# Patient Record
Sex: MALE | Race: WHITE | HISPANIC OR LATINO | Employment: PART TIME | URBAN - METROPOLITAN AREA
[De-identification: names, ages, dates, MRNs, and addresses within clinical notes are randomized per-mention and may not be internally consistent; named-entity substitution may affect disease eponyms.]

---

## 2019-06-17 ENCOUNTER — HOSPITAL ENCOUNTER (EMERGENCY)
Facility: MEDICAL CENTER | Age: 24
End: 2019-06-17
Attending: EMERGENCY MEDICINE
Payer: COMMERCIAL

## 2019-06-17 VITALS
WEIGHT: 177.69 LBS | BODY MASS INDEX: 24.88 KG/M2 | DIASTOLIC BLOOD PRESSURE: 80 MMHG | SYSTOLIC BLOOD PRESSURE: 124 MMHG | OXYGEN SATURATION: 99 % | HEIGHT: 71 IN | RESPIRATION RATE: 16 BRPM | TEMPERATURE: 98.7 F | HEART RATE: 92 BPM

## 2019-06-17 DIAGNOSIS — S09.90XA CLOSED HEAD INJURY, INITIAL ENCOUNTER: ICD-10-CM

## 2019-06-17 DIAGNOSIS — S16.1XXA STRAIN OF NECK MUSCLE, INITIAL ENCOUNTER: ICD-10-CM

## 2019-06-17 PROCEDURE — 99283 EMERGENCY DEPT VISIT LOW MDM: CPT

## 2019-06-17 NOTE — LETTER
"  FORM C-4:  EMPLOYEE’S CLAIM FOR COMPENSATION/ REPORT OF INITIAL TREATMENT  EMPLOYEE’S CLAIM - PROVIDE ALL INFORMATION REQUESTED   First Name  Eris Last Name  Vernell Birthdate             Age  1995 24 y.o. Sex  male Claim Number   Home Employee Address  [id number]  [place]                                     Zip  [address] Height  1.803 m (5' 11\") Weight  80.6 kg (177 lb 11.1 oz) N  [id number]   Mailing Employee Address                           [id number]   [place]               Zip  [address] Telephone  [phone] (home)  Primary Language Spoken  ENGLISH   Insurer  *** Third Party   MISC WORKERS COMP Employee's Occupation (Job Title) When Injury or Occupational Disease Occurred     Employer's Name   Telephone  [phone]    Employer Address  [address]  [facility] [address]] Zip  [address]   Date of Injury  6/17/2019       Hour of Injury  2:30 PM Date Employer Notified  6/17/2019 Last Day of Work after Injury or Occupational Disease  6/17/2019 Supervisor to Whom Injury Reported  [facility]   Address or Location of Accident (if applicable)  [[address]   What were you doing at the time of accident? (if applicable)  Pulled tire from top rack & placed on floor to check to see if right one & had a diff. tire fall on me    How did this injury or occupational disease occur? Be specific and answer in detail. Use additional sheet if necessary)  Pulled tire from top rack & placed on floor to check to see if right one & had a diff. tire fall on me   If you believe that you have an occupational disease, when did you first have knowledge of the disability and it relationship to your employment?  N/A Witnesses to the Accident  N/A     Nature of Injury or Occupational Disease  Workers' Compensation  Part(s) of Body Injured or Affected  Brain, Soft Tissue - Neck, Upper Back Area (Thoracic Area)    I certify that the " above is true and correct to the best of my knowledge and that I have provided this information in order to obtain the benefits of [place]  I hereby authorize any physician, chiropractor, surgeon, practitioner, or other person, any hospital, including [facility], any medical service organization, any insurance company, or other institution or organization to release to each other, any medical or other information, including benefits paid or payable, pertinent to this injury or disease, except information relative to diagnosis, treatment and/or counseling for AIDS, psychological conditions, alcohol or controlled substances, for which I must give specific authorization.  A Photostat of this authorization shall be as valid as the original.   Date Place   Employee’s Signature   THIS REPORT MUST BE COMPLETED AND MAILED WITHIN 3 WORKING DAYS OF TREATMENT   Place  [facility], EMERGENCY DEPT  Name of Facility   [facility]   Date  6/17/2019 Diagnosis  (S09.90XA) Closed head injury, initial encounter  (S16.1XXA) Strain of neck muscle, initial encounter Is there evidence the injured employee was under the influence of alcohol and/or another controlled substance at the time of accident?   Hour  8:43 PM Description of Injury or Disease  Closed head injury, initial encounter  Strain of neck muscle, initial encounter No   Treatment  Supportive care  Have you advised the patient to remain off work five days or more?         No   X-Ray Findings      If Yes   From Date    To Date      From information given by the employee, together with medical evidence, can you directly connect this injury or occupational disease as job incurred?  Yes If No, is the employee capable of: Full Duty  No Modified Duty  No   Is additional medical care by a physician  "indicated?  Yes If Modified Duty, Specify any Limitations / Restrictions  No work till wednesday     Do you know of any previous injury or disease contributing to this condition or occupational disease?  No   Date  6/17/2019 Print Doctor’s Name  Pilar Suleman MCELROY KRISTA certify the employer’s copy of this form was mailed on:   Address  [address] [address] [phone] Insurer’s Use Only   [facility]  [address]    Provider’s Tax ID Number  [id number] Telephone  Dept: [phone]    Doctor’s Signature    Degree       Original - TREATING PHYSICIAN OR CHIROPRACTOR   Pg 2-Insurer/TPA   Pg 3-Employer   Pg 4-Employee                                                                                                  Form C-4 (rev01/03)     BRIEF DESCRIPTION OF RIGHTS AND BENEFITS  (Pursuant to [place]RS 616C.050)    Notice of Injury or Occupational Disease (Incident Report Form C-1): If an injury or occupational disease (OD) arises out of and in the course of employment, you must provide written notice to your employer as soon as practicable, but no later than 7 days after the accident or OD. Your employer shall maintain a sufficient supply of the required forms.    Claim for Compensation (Form C-4): If medical treatment is sought, the form C-4 is available at the place of initial treatment. A completed \"Claim for Compensation\" (Form C-4) must be filed within 90 days after an accident or OD. The treating physician or chiropractor must, within 3 working days after treatment, complete and mail to the employer, the employer's insurer and third-party , the Claim for Compensation.    Medical Treatment: If you require medical treatment for your on-the-job injury or OD, you may be required to select a physician or chiropractor from a list provided by your workers’ compensation insurer, if it has contracted with an Organization for Managed Care (MCO) or Preferred Provider Organization (PPO) or " providers of health care. If your employer has not entered into a contract with an MCO or PPO, you may select a physician or chiropractor from the Panel of Physicians and Chiropractors. Any medical costs related to your industrial injury or OD will be paid by your insurer.    Temporary Total Disability (TTD): If your doctor has certified that you are unable to work for a period of at least 5 consecutive days, or 5 cumulative days in a 20-day period, or places restrictions on you that your employer does not accommodate, you may be entitled to TTD compensation.    Temporary Partial Disability (TPD): If the wage you receive upon reemployment is less than the compensation for TTD to which you are entitled, the insurer may be required to pay you TPD compensation to make up the difference. TPD can only be paid for a maximum of 24 months.    Permanent Partial Disability (PPD): When your medical condition is stable and there is an indication of a PPD as a result of your injury or OD, within 30 days, your insurer must arrange for an evaluation by a rating physician or chiropractor to determine the degree of your PPD. The amount of your PPD award depends on the date of injury, the results of the PPD evaluation and your age and wage.    Permanent Total Disability (PTD): If you are medically certified by a treating physician or chiropractor as permanently and totally disabled and have been granted a PTD status by your insurer, you are entitled to receive monthly benefits not to exceed 66 2/3% of your average monthly wage. The amount of your PTD payments is subject to reduction if you previously received a PPD award.    Vocational Rehabilitation Services: You may be eligible for vocational rehabilitation services if you are unable to return to the job due to a permanent physical impairment or permanent restrictions as a result of your injury or occupational disease.    Transportation and Per Laurie Reimbursement: You may be  eligible for travel expenses and per katlyn associated with medical treatment.  Reopening: You may be able to reopen your claim if your condition worsens after claim closure.    Appeal Process: If you disagree with a written determination issued by the insurer or the insurer does not respond to your request, you may appeal to the Department of Administration, , by following the instructions contained in your determination letter. You must appeal the determination within 70 days from the date of the determination letter at [address], [address], [place] [address], or [address], [place], [place] [address]. If you disagree with the  decision, you may appeal to the Department of Administration, . You must file your appeal within 30 days from the date of the  decision letter at [address], [address], [place], [place] [address], or [address], [place], [place] [address]. If you disagree with a decision of an , you may file a petition for judicial review with the District Court. You must do so within 30 days of the Appeal Officer’s decision. You may be represented by an  at your own expense or you may contact the [facility] for possible representation.    [place]  for Injured Workers (NAIW): If you disagree with a  decision, you may request that NAIW represent you without charge at an  Hearing. For information regarding denial of benefits, you may contact the [facility] at: [address], [address], [place] [address], [phone], or [address], [address], [place] [address], [phone]    To File a Complaint with the Division: If you wish to file a complaint with the  of the Division of Industrial Relations (DIR), please contact the Workers’ Compensation Section, [address], [address], [place],  [place] [address], telephone [phone], or [address], [address], [place] [address], telephone [phone].    For assistance with Workers’ Compensation Issues: you may contact the Office of the Governor Consumer Health Assistance, [address] [place] [address], [address], [place], [place] [address], Toll Free 1-536.567.1288, Web site: [email or web address].[email or web address]., E-mail [email or web address].                                                                                                                                                                               __________________________________________________________________                                    _________________            Employee Name / Signature                                                                                                                            Date                                       D-2 (rev. 10/07)

## 2019-06-17 NOTE — LETTER
"  FORM C-4:  EMPLOYEE’S CLAIM FOR COMPENSATION/ REPORT OF INITIAL TREATMENT  EMPLOYEE’S CLAIM - PROVIDE ALL INFORMATION REQUESTED   First Name  Eris Last Name  Vernell Birthdate             Age  1995 24 y.o. Sex  male Claim Number   Home Employee Address  [address]  [place]                                     Zip  [address] Height  1.803 m (5' 11\") Weight  80.6 kg (177 lb 11.1 oz) N  [id number]   Mailing Employee Address                           [id number]   [place]               Zip  [address] Telephone  [phone] (home)  Primary Language Spoken  ENGLISH   Insurer  *** Third Party   MISC WORKERS COMP Employee's Occupation (Job Title) When Injury or Occupational Disease Occurred     Employer's Name   Telephone  [phone]    Employer Address  [address]  [facility] [address]] Zip  [address]   Date of Injury  6/17/2019       Hour of Injury  2:30 PM Date Employer Notified  6/17/2019 Last Day of Work after Injury or Occupational Disease  6/17/2019 Supervisor to Whom Injury Reported  [facility]   Address or Location of Accident (if applicable)  [[address]   What were you doing at the time of accident? (if applicable)  Pulled tire from top rack & placed on floor to check to see if right one & had a diff. tire fall on me    How did this injury or occupational disease occur? Be specific and answer in detail. Use additional sheet if necessary)  Pulled tire from top rack & placed on floor to check to see if right one & had a diff. tire fall on me   If you believe that you have an occupational disease, when did you first have knowledge of the disability and it relationship to your employment?  N/A Witnesses to the Accident  N/A     Nature of Injury or Occupational Disease  Workers' Compensation  Part(s) of Body Injured or Affected  Brain, Soft Tissue - Neck, Upper Back Area (Thoracic Area)    I certify that the " above is true and correct to the best of my knowledge and that I have provided this information in order to obtain the benefits of [place] ([place] 616A [place], inclusive or Chapter 617 of [place]RS).  I hereby authorize any physician, chiropractor, surgeon, practitioner, or other person, any hospital, including [facility], any medical service organization, any insurance company, or other institution or organization to release to each other, any medical or other information, including benefits paid or payable, pertinent to this injury or disease, except information relative to diagnosis, treatment and/or counseling for AIDS, psychological conditions, alcohol or controlled substances, for which I must give specific authorization.  A Photostat of this authorization shall be as valid as the original.   Date Place   Employee’s Signature   THIS REPORT MUST BE COMPLETED AND MAILED WITHIN 3 WORKING DAYS OF TREATMENT   Place  [facility], EMERGENCY DEPT  Name of Facility   [facility]   Date  6/17/2019 Diagnosis  (S09.90XA) Closed head injury, initial encounter  (S16.1XXA) Strain of neck muscle, initial encounter Is there evidence the injured employee was under the influence of alcohol and/or another controlled substance at the time of accident?   Hour  8:47 PM Description of Injury or Disease  Closed head injury, initial encounter  Strain of neck muscle, initial encounter No   Treatment  Supportive care  Have you advised the patient to remain off work five days or more?         No   X-Ray Findings      If Yes   From Date    To Date      From information given by the employee, together with medical evidence, can you directly connect this injury or occupational disease as job incurred?  Yes If No, is the employee capable of: Full Duty  No Modified Duty  No   Is additional medical care by a physician  "indicated?  Yes If Modified Duty, Specify any Limitations / Restrictions  No work till wednesday     Do you know of any previous injury or disease contributing to this condition or occupational disease?  No   Date  6/17/2019 Print Doctor’s Name  Pilar Suleman MCELROY KRISTA certify the employer’s copy of this form was mailed on:   Address  [address] [address] [phone] Insurer’s Use Only   [facility]  [address]    Provider’s Tax ID Number  [id number] Telephone  Dept: [phone]    Doctor’s Signature    Degree       Original - TREATING PHYSICIAN OR CHIROPRACTOR   Pg 2-Insurer/TPA   Pg 3-Employer   Pg 4-Employee                                                                                                  Form C-4 (rev01/03)     BRIEF DESCRIPTION OF RIGHTS AND BENEFITS  ([place]ursuant to [place] 616C.050)    Notice of Injury or Occupational Disease (Incident Report Form C-1): If an injury or occupational disease (OD) arises out of and in the course of employment, you must provide written notice to your employer as soon as practicable, but no later than 7 days after the accident or OD. Your employer shall maintain a sufficient supply of the required forms.    Claim for Compensation (Form C-4): If medical treatment is sought, the form C-4 is available at the place of initial treatment. A completed \"Claim for Compensation\" (Form C-4) must be filed within 90 days after an accident or OD. The treating physician or chiropractor must, within 3 working days after treatment, complete and mail to the employer, the employer's insurer and third-party , the Claim for Compensation.    Medical Treatment: If you require medical treatment for your on-the-job injury or OD, you may be required to select a physician or chiropractor from a list provided by your workers’ compensation insurer, if it has contracted with an Organization for Managed Care (MCO) or Preferred Provider Organization (PPO) or " providers of health care. If your employer has not entered into a contract with an MCO or PPO, you may select a physician or chiropractor from the Panel of Physicians and Chiropractors. Any medical costs related to your industrial injury or OD will be paid by your insurer.    Temporary Total Disability (TTD): If your doctor has certified that you are unable to work for a period of at least 5 consecutive days, or 5 cumulative days in a 20-day period, or places restrictions on you that your employer does not accommodate, you may be entitled to TTD compensation.    Temporary Partial Disability (TPD): If the wage you receive upon reemployment is less than the compensation for TTD to which you are entitled, the insurer may be required to pay you TPD compensation to make up the difference. TPD can only be paid for a maximum of 24 months.    Permanent Partial Disability (PPD): When your medical condition is stable and there is an indication of a PPD as a result of your injury or OD, within 30 days, your insurer must arrange for an evaluation by a rating physician or chiropractor to determine the degree of your PPD. The amount of your PPD award depends on the date of injury, the results of the PPD evaluation and your age and wage.    Permanent Total Disability (PTD): If you are medically certified by a treating physician or chiropractor as permanently and totally disabled and have been granted a PTD status by your insurer, you are entitled to receive monthly benefits not to exceed 66 2/3% of your average monthly wage. The amount of your PTD payments is subject to reduction if you previously received a PPD award.    Vocational Rehabilitation Services: You may be eligible for vocational rehabilitation services if you are unable to return to the job due to a permanent physical impairment or permanent restrictions as a result of your injury or occupational disease.    Transportation and Per Laurie Reimbursement: You may be  eligible for travel expenses and per katlyn associated with medical treatment.  Reopening: You may be able to reopen your claim if your condition worsens after claim closure.    Appeal Process: If you disagree with a written determination issued by the insurer or the insurer does not respond to your request, you may appeal to the Department of Administration, , by following the instructions contained in your determination letter. You must appeal the determination within 70 days from the date of the determination letter at [address], [address], [place] [address], or [address], [place], [place] [address]. If you disagree with the  decision, you may appeal to the Department of Administration, . You must file your appeal within 30 days from the date of the  decision letter at [address], [address], [place], [place] [address], or [address] [facility] [address], [place], [place] [address]. If you disagree with a decision of an , you may file a petition for judicial review with the District Court. You must do so within 30 days of the Appeal Officer’s decision. You may be represented by an  at your own expense or you may contact the [facility] for possible representation.    [place]  for Injured Workers (NAIW): If you disagree with a  decision, you may request that NAIW represent you without charge at an  Hearing. For information regarding denial of benefits, you may contact the [facility] at: [address], [address], [place] [address], [phone], or [address], [place] [address], [phone]    To File a Complaint with the Division: If you wish to file a complaint with the  of the Division of Industrial Relations (DIR), please contact the Workers’ Compensation Section, [address], [facility] [address], [place],  [place] [address], telephone [phone], or [address], [address], [place] [address], telephone [phone].    For assistance with Workers’ Compensation Issues: you may contact the Office of the Governor Consumer Health Assistance, [address] [place] [address], [address], [place], [place] [address], Toll Free 1-377.230.2090, Web site: [email or web address]://[email or web address]., E-mail [email or web address].                                                                                                                                                                               __________________________________________________________________                                    _________________            Employee Name / Signature                                                                                                                            Date                                       D-2 (rev. 10/07)

## 2019-06-17 NOTE — LETTER
"  FORM C-4:  EMPLOYEE’S CLAIM FOR COMPENSATION/ REPORT OF INITIAL TREATMENT  EMPLOYEE’S CLAIM - PROVIDE ALL INFORMATION REQUESTED   First Name  Eris Last Name  Vernell Birthdate             Age  1995 24 y.o. Sex  male Claim Number   Home Employee Address  [id number]  [place]                                     Zip  [address] Height  1.803 m (5' 11\") Weight  80.6 kg (177 lb 11.1 oz) [facility]     Mailing Employee Address                           [id number]   [place]               Zip  [address] Telephone  [phone] (home)  Primary Language Spoken  ENGLISH   Insurer   Third Party    Employee's Occupation (Job Title) When Injury or Occupational Disease Occurred     Employer's Name  [employer]  Telephone  [phone]    Employer Address  [address]  [facility] [address]] Zip  [address]   Date of Injury  6/17/2019       Hour of Injury  2:30 PM Date Employer Notified  6/17/2019 Last Day of Work after Injury or Occupational Disease  6/17/2019 Supervisor to Whom Injury Reported  [facility]   Address or Location of Accident (if applicable)  [[place] .]   What were you doing at the time of accident? (if applicable)  Pulled tire from top rack & placed on floor to check to see if right one & had a diff. tire fall on me    How did this injury or occupational disease occur? Be specific and answer in detail. Use additional sheet if necessary)  Pulled tire from top rack & placed on floor to check to see if right one & had a diff. tire fall on me   If you believe that you have an occupational disease, when did you first have knowledge of the disability and it relationship to your employment?  N/A Witnesses to the Accident  N/A     Nature of Injury or Occupational Disease  Workers' Compensation  Part(s) of Body Injured or Affected  Brain, Soft Tissue - Neck, Upper Back Area (Thoracic Area)    I certify " that the above is true and correct to the best of my knowledge and that I have provided this information in order to obtain the benefits of [place]  I hereby authorize any physician, chiropractor, surgeon, practitioner, or other person, any hospital, including [facility], any medical service organization, any insurance company, or other institution or organization to release to each other, any medical or other information, including benefits paid or payable, pertinent to this injury or disease, except information relative to diagnosis, treatment and/or counseling for AIDS, psychological conditions, alcohol or controlled substances, for which I must give specific authorization.  A Photostat of this authorization shall be as valid as the original.   Date  06/17/2019 Place  [facility] Employee’s Signature   THIS REPORT MUST BE COMPLETED AND MAILED WITHIN 3 WORKING DAYS OF TREATMENT   Place  [facility], EMERGENCY DEPT  Name of Facility   [facility]   Date  6/17/2019 Diagnosis  (S09.90XA) Closed head injury, initial encounter  (S16.1XXA) Strain of neck muscle, initial encounter Is there evidence the injured employee was under the influence of alcohol and/or another controlled substance at the time of accident?   Hour  8:49 PM Description of Injury or Disease  Closed head injury, initial encounter  Strain of neck muscle, initial encounter No   Treatment  Supportive care  Have you advised the patient to remain off work five days or more?         No   X-Ray Findings      If Yes   From Date    To Date      From information given by the employee, together with medical evidence, can you directly connect this injury or occupational disease as job incurred?  Yes If No, is the employee capable of: Full Duty  No Modified Duty  No   Is additional medical care  "by a physician indicated?  Yes If Modified Duty, Specify any Limitations / Restrictions  No work till wednesday     Do you know of any previous injury or disease contributing to this condition or occupational disease?  No   Date  6/17/2019 Print Doctor’s Name  Shanda Quezada certify the employer’s copy of this form was mailed on:   Address  [address] [place] [id number] [phone] Insurer’s Use Only   [facility]  [address]    Provider’s Tax ID Number  [id number] Telephone  Dept: [phone]    Doctor’s Signature  e-SHANDA Knowles M.D. Degree   M.D.    Original - TREATING PHYSICIAN OR CHIROPRACTOR   Pg 2-Insurer/TPA   Pg 3-Employer   Pg 4-Employee                                                                                                  Form C-4 (rev01/03)     BRIEF DESCRIPTION OF RIGHTS AND BENEFITS  (Pursuant to [place] 616C.050)    Notice of Injury or Occupational Disease (Incident Report Form C-1): If an injury or occupational disease (OD) arises out of and in the course of employment, you must provide written notice to your employer as soon as practicable, but no later than 7 days after the accident or OD. Your employer shall maintain a sufficient supply of the required forms.  Claim for Compensation (Form C-4): If medical treatment is sought, the form C-4 is available at the place of initial treatment. A completed \"Claim for Compensation\" (Form C-4) must be filed within 90 days after an accident or OD. The treating physician or chiropractor must, within 3 working days after treatment, complete and mail to the employer, the employer's insurer and third-party , the Claim for Compensation.  Medical Treatment: If you require medical treatment for your on-the-job injury or OD, you may be required to select a physician or chiropractor from a list provided by your workers’ compensation insurer, if it has contracted with an Organization for Managed Care (MCO) or " Preferred Provider Organization (PPO) or providers of health care. If your employer has not entered into a contract with an MCO or PPO, you may select a physician or chiropractor from the Panel of Physicians and Chiropractors. Any medical costs related to your industrial injury or OD will be paid by your insurer.  Temporary Total Disability (TTD): If your doctor has certified that you are unable to work for a period of at least 5 consecutive days, or 5 cumulative days in a 20-day period, or places restrictions on you that your employer does not accommodate, you may be entitled to TTD compensation.  Temporary Partial Disability (TPD): If the wage you receive upon reemployment is less than the compensation for TTD to which you are entitled, the insurer may be required to pay you TPD compensation to make up the difference. TPD can only be paid for a maximum of 24 months.  Permanent Partial Disability (PPD): When your medical condition is stable and there is an indication of a PPD as a result of your injury or OD, within 30 days, your insurer must arrange for an evaluation by a rating physician or chiropractor to determine the degree of your PPD. The amount of your PPD award depends on the date of injury, the results of the PPD evaluation and your age and wage.  Permanent Total Disability (PTD): If you are medically certified by a treating physician or chiropractor as permanently and totally disabled and have been granted a PTD status by your insurer, you are entitled to receive monthly benefits not to exceed 66 2/3% of your average monthly wage. The amount of your PTD payments is subject to reduction if you previously received a PPD award.  Vocational Rehabilitation Services: You may be eligible for vocational rehabilitation services if you are unable to return to the job due to a permanent physical impairment or permanent restrictions as a result of your injury or occupational disease.  Transportation and Per Laurie  Reimbursement: You may be eligible for travel expenses and per katlyn associated with medical treatment.  Reopening: You may be able to reopen your claim if your condition worsens after claim closure.  Appeal Process: If you disagree with a written determination issued by the insurer or the insurer does not respond to your request, you may appeal to the Department of Administration, , by following the instructions contained in your determination letter. You must appeal the determination within 70 days from the date of the determination letter at [address], [address], [place], [place] [address], or [address], [address], [place], [place] [address]. If you disagree with the  decision, you may appeal to the Department of Administration, . You must file your appeal within 30 days from the date of the  decision letter at [address], [address], [place], [place] [address], or [address], [place], [place] [address]. If you disagree with a decision of an , you may file a petition for judicial review with the District Court. You must do so within 30 days of the Appeal Officer’s decision. You may be represented by an  at your own expense or you may contact the [facility] for possible representation.  [place]  for Injured Workers (NAIW): If you disagree with a  decision, you may request that NAIW represent you without charge at an  Hearing. For information regarding denial of benefits, you may contact the [facility] at: [address], [address], [place] [address], [phone], or [address] [facility] [address], [place] [address], [phone]  To File a Complaint with the Division: If you wish to file a complaint with the  of the Division of Industrial Relations (DIR), please contact the Workers’ Compensation Section, [address],  [address], [place], [place] [address], telephone [phone], or [address], [address], [place], [place] [address], telephone [phone].  For assistance with Workers’ Compensation Issues: you may contact the Office of the Governor Consumer Health Assistance, [address] [place] [address], [address], [place], [place] [address], Toll Free 1-415.268.8297, Web site: http://govcha.[facility].nv., E-mail [email or web address].                                                                                                                                                                               __________________________________________________________________                                    _________________            Employee Name / Signature                                                                                                                            Date                                       D-2 (rev. 10/07)

## 2019-06-18 NOTE — ED NOTES
Pt reports pain has improved. Pt had significant pain upon time of injury that has no since improved. Pt changed into gown. Chart up for ERP.

## 2019-06-18 NOTE — ED PROVIDER NOTES
"ED Provider Note    Scribed for Suleman Quezada M.D. by Andie Carvajal. 6/17/2019  8:33 PM    Primary care provider: Pcp Pt States None  Means of arrival: Walk in  History obtained from: Patient  History limited by: None    CHIEF COMPLAINT  Chief Complaint   Patient presents with   • Head Injury     Pt states while at work he was trying to remove a vehicle tire from the top rack of a shelf and another tire fell hitting him in the back of his head. Pt denies LOC. Pt endoreses feeling nauseated after incident, but no vomitting. Pt now complaining of back of head and neck pain.       HPI  Eris Matt is a 24 y.o. male who presents to the Emergency Department for evaluation of a head injury onset 2 PM today. The patient states he was trying to remove a vehicle tire from the top rack of a shelf when another tire fell striking him in the back of his head. He denies loss of consciousness. There are no alleviating or exacerbating factors noted. No known drug allergies.  Patient had a mild headache and neck ache.  He denies vomiting.  He said he felt slightly nauseated.  Injury happened at 2 PM.      REVIEW OF SYSTEMS  Pertinent positives include head injury. Pertinent negatives include no loss of consiouness.  No chest pain.  No abdominal pain.  Positive nausea.  No vomiting.    PAST MEDICAL HISTORY   None noted    SURGICAL HISTORY  patient denies any surgical history    SOCIAL HISTORY  Social History   Substance Use Topics   • Smoking status: Never Smoker   • Smokeless tobacco: Never Used   • Alcohol use Yes      Comment: Occasionally      History   Drug Use No       FAMILY HISTORY  History reviewed. No pertinent family history.    CURRENT MEDICATIONS  Home Medications    **Home medications have not yet been reviewed for this encounter**         ALLERGIES  No Known Allergies    PHYSICAL EXAM  VITAL SIGNS: /79   Pulse 83   Temp 37.1 °C (98.7 °F) (Temporal)   Resp 14   Ht 1.803 m (5' 11\")   Wt 80.6 kg (177 lb " 11.1 oz)   SpO2 98%   BMI 24.78 kg/m²     Vital signs reviewed.  Constitutional:  Appears well-developed and well-nourished. No distress. Pleasant.  Conversant.  Well-appearing  Head:Normocephalic. Negative colon signs. No signs of hemotympanum.  Mouth/Throat: Oropharynx is clear and moist.   Eyes: EOM are normal. Pupils are equal, round, and reactive to light.   Neck: Normal range of motion. Neck supple. Positive bilateral paraspinal muscle tenderness.  Cardiovascular: Normal rate, regular rhythm and normal heart sounds.    Pulmonary/Chest: Effort normal and breath sounds normal. No wheezes.   Abdominal: Soft. There is no tenderness. There is no rebound and no guarding.   Musculoskeletal: Exhibits no edema.   Lymphadenopathy: No cervical adenopathy.   Neurological: Patient is alert and oriented to person, place, and time. CNs II - XII intact. DTRs intact. Normal sensation and strength.  Skin: Skin is warm and dry.   Psychiatric: Patient has a normal mood and affect. Behavior is normal.       RADIOLOGY  Cope CT head criteria and Cope CT C-spine criteria are both negative.  This rules out need for radiographic diagnostics.  Patient was reassured and discharged home in stable condition  All labs reviewed by me.        COURSE & MEDICAL DECISION MAKING  Pertinent Labs & Imaging studies reviewed. (See chart for details) The patient's [facility] and past medical  records were reviewed    8:33 PM - Patient seen and examined at bedside. Patient will be treated with Tylenol as needed for pain.   Patient was reassured and discharged home in stable condition    FINAL IMPRESSION  1. Closed head injury, initial encounter    2. Strain of neck muscle, initial encounter          Andie VELASCO (Olegario), am scribing for, and in the presence of, Suleman Quezada M.D..    Electronically signed by: Andie Carvajal (Olegario), 6/17/2019    Suleman VELASCO M.D. personally performed the services described in this  documentation, as scribed by Andie Carvajal in my presence, and it is both accurate and complete.  E  The note accurately reflects work and decisions made by me.  Suleman Quezada  6/17/2019  8:54 PM

## 2019-06-18 NOTE — ED NOTES
Discharge instructions given to pt. Prescriptions unchanged. Pt educated, verbalizes understanding. All belongings accounted for. Pt ambulated out of ED with steady gait to go home with friends.       no

## 2019-06-18 NOTE — ED TRIAGE NOTES
"Ersi Matt  Chief Complaint   Patient presents with   • Head Injury     Pt states while at work he was trying to remove a vehicle tire from the top rack of a shelf and another tire fell hitting him in the back of his head. Pt denies LOC. Pt endoreses feeling nauseated after incident, but no vomitting. Pt now complaining of back of head and neck pain.     Pt ambulatory to triage with above complaint.     /79   Pulse 83   Temp 37.1 °C (98.7 °F) (Temporal)   Resp 14   Ht 1.803 m (5' 11\")   Wt 80.6 kg (177 lb 11.1 oz)   SpO2 98%   BMI 24.78 kg/m²     Pt informed of triage process and encouraged to notify staff of any changes or concerns. Pt verbalized understanding of instructions. Apologized for long wait time. Pt placed back in lobby.     "

## 2019-06-19 ENCOUNTER — APPOINTMENT (OUTPATIENT)
Dept: RADIOLOGY | Facility: MEDICAL CENTER | Age: 24
End: 2019-06-19
Attending: EMERGENCY MEDICINE
Payer: COMMERCIAL

## 2019-06-19 ENCOUNTER — HOSPITAL ENCOUNTER (EMERGENCY)
Facility: MEDICAL CENTER | Age: 24
End: 2019-06-19
Attending: EMERGENCY MEDICINE
Payer: COMMERCIAL

## 2019-06-19 VITALS
DIASTOLIC BLOOD PRESSURE: 86 MMHG | RESPIRATION RATE: 16 BRPM | HEART RATE: 68 BPM | SYSTOLIC BLOOD PRESSURE: 123 MMHG | TEMPERATURE: 98.3 F | OXYGEN SATURATION: 98 % | BODY MASS INDEX: 25.34 KG/M2 | WEIGHT: 181 LBS | HEIGHT: 71 IN

## 2019-06-19 DIAGNOSIS — G44.319 ACUTE POST-TRAUMATIC HEADACHE, NOT INTRACTABLE: ICD-10-CM

## 2019-06-19 LAB
ALBUMIN SERPL BCP-MCNC: 4.5 G/DL (ref 3.2–4.9)
ALBUMIN/GLOB SERPL: 1.6 G/DL
ALP SERPL-CCNC: 91 U/L (ref 30–99)
ALT SERPL-CCNC: 17 U/L (ref 2–50)
ANION GAP SERPL CALC-SCNC: 8 MMOL/L (ref 0–11.9)
AST SERPL-CCNC: 20 U/L (ref 12–45)
BASOPHILS # BLD AUTO: 0.8 % (ref 0–1.8)
BASOPHILS # BLD: 0.05 K/UL (ref 0–0.12)
BILIRUB SERPL-MCNC: 0.5 MG/DL (ref 0.1–1.5)
BUN SERPL-MCNC: 11 MG/DL (ref 8–22)
CALCIUM SERPL-MCNC: 9.4 MG/DL (ref 8.5–10.5)
CHLORIDE SERPL-SCNC: 107 MMOL/L (ref 96–112)
CO2 SERPL-SCNC: 24 MMOL/L (ref 20–33)
CREAT SERPL-MCNC: 0.92 MG/DL (ref 0.5–1.4)
EOSINOPHIL # BLD AUTO: 0.23 K/UL (ref 0–0.51)
EOSINOPHIL NFR BLD: 3.7 % (ref 0–6.9)
ERYTHROCYTE [DISTWIDTH] IN BLOOD BY AUTOMATED COUNT: 40.8 FL (ref 35.9–50)
GLOBULIN SER CALC-MCNC: 2.8 G/DL (ref 1.9–3.5)
GLUCOSE SERPL-MCNC: 97 MG/DL (ref 65–99)
HCT VFR BLD AUTO: 49.8 % (ref 42–52)
HGB BLD-MCNC: 16.2 G/DL (ref 14–18)
IMM GRANULOCYTES # BLD AUTO: 0.02 K/UL (ref 0–0.11)
IMM GRANULOCYTES NFR BLD AUTO: 0.3 % (ref 0–0.9)
LYMPHOCYTES # BLD AUTO: 1.42 K/UL (ref 1–4.8)
LYMPHOCYTES NFR BLD: 23 % (ref 22–41)
MCH RBC QN AUTO: 27.8 PG (ref 27–33)
MCHC RBC AUTO-ENTMCNC: 32.5 G/DL (ref 33.7–35.3)
MCV RBC AUTO: 85.4 FL (ref 81.4–97.8)
MONOCYTES # BLD AUTO: 0.43 K/UL (ref 0–0.85)
MONOCYTES NFR BLD AUTO: 7 % (ref 0–13.4)
NEUTROPHILS # BLD AUTO: 4.03 K/UL (ref 1.82–7.42)
NEUTROPHILS NFR BLD: 65.2 % (ref 44–72)
NRBC # BLD AUTO: 0 K/UL
NRBC BLD-RTO: 0 /100 WBC
PLATELET # BLD AUTO: 315 K/UL (ref 164–446)
PMV BLD AUTO: 9.6 FL (ref 9–12.9)
POTASSIUM SERPL-SCNC: 3.8 MMOL/L (ref 3.6–5.5)
PROT SERPL-MCNC: 7.3 G/DL (ref 6–8.2)
RBC # BLD AUTO: 5.83 M/UL (ref 4.7–6.1)
SODIUM SERPL-SCNC: 139 MMOL/L (ref 135–145)
WBC # BLD AUTO: 6.2 K/UL (ref 4.8–10.8)

## 2019-06-19 PROCEDURE — 700105 HCHG RX REV CODE 258: Performed by: EMERGENCY MEDICINE

## 2019-06-19 PROCEDURE — 80053 COMPREHEN METABOLIC PANEL: CPT

## 2019-06-19 PROCEDURE — 96374 THER/PROPH/DIAG INJ IV PUSH: CPT

## 2019-06-19 PROCEDURE — 99284 EMERGENCY DEPT VISIT MOD MDM: CPT

## 2019-06-19 PROCEDURE — 700111 HCHG RX REV CODE 636 W/ 250 OVERRIDE (IP): Performed by: EMERGENCY MEDICINE

## 2019-06-19 PROCEDURE — 70496 CT ANGIOGRAPHY HEAD: CPT

## 2019-06-19 PROCEDURE — 700117 HCHG RX CONTRAST REV CODE 255: Performed by: EMERGENCY MEDICINE

## 2019-06-19 PROCEDURE — 85025 COMPLETE CBC W/AUTO DIFF WBC: CPT

## 2019-06-19 PROCEDURE — 70498 CT ANGIOGRAPHY NECK: CPT

## 2019-06-19 RX ORDER — ONDANSETRON 2 MG/ML
4 INJECTION INTRAMUSCULAR; INTRAVENOUS ONCE
Status: COMPLETED | OUTPATIENT
Start: 2019-06-19 | End: 2019-06-19

## 2019-06-19 RX ORDER — SODIUM CHLORIDE 9 MG/ML
1000 INJECTION, SOLUTION INTRAVENOUS ONCE
Status: COMPLETED | OUTPATIENT
Start: 2019-06-19 | End: 2019-06-19

## 2019-06-19 RX ORDER — ONDANSETRON 4 MG/1
4 TABLET, ORALLY DISINTEGRATING ORAL EVERY 8 HOURS PRN
Qty: 10 TAB | Refills: 0 | Status: SHIPPED | OUTPATIENT
Start: 2019-06-19

## 2019-06-19 RX ADMIN — SODIUM CHLORIDE 1000 ML: 9 INJECTION, SOLUTION INTRAVENOUS at 13:21

## 2019-06-19 RX ADMIN — IOHEXOL 100 ML: 350 INJECTION, SOLUTION INTRAVENOUS at 14:20

## 2019-06-19 RX ADMIN — ONDANSETRON 4 MG: 2 INJECTION INTRAMUSCULAR; INTRAVENOUS at 13:21

## 2019-06-19 ASSESSMENT — LIFESTYLE VARIABLES: DO YOU DRINK ALCOHOL: NO

## 2019-06-19 NOTE — ED PROVIDER NOTES
"ED Provider Note    CHIEF COMPLAINT  Chief Complaint   Patient presents with   • Headache     Pt reports symptoms started today. Seen on Monday s/p tire falling on his head.   • Nausea       HPI  Eris Matt is a 24 y.o. male with no significant medical problems who presents complaining of headache, nausea, and vomiting.    Patient states he was seen here in the ER on Monday after he had a 26 pound tire fall from 3 feet above his head onto his occipital region and neck.  He was advised to return for worsening symptoms.  He has had an increasing headache that is throbbing, nonradiating, occipital parietal now with vomiting today while at work.  He also reports having had slurred speech yesterday.  He denies new visual symptoms, weakness, numbness, neck pain, fever, chills, recent URI symptoms, loss of consciousness with the incident on Monday.  He has taken Tylenol sometimes with relief of his headache.  The headache has been waxing and waning.      ALLERGIES  No Known Allergies    CURRENT MEDICATIONS  Home Medications     Reviewed by Kristin Pack R.N. (Registered Nurse) on 06/19/19 at 1240  Med List Status: Complete   Medication Last Dose Status        Patient Jorje Taking any Medications                       PAST MEDICAL HISTORY      Denies    SURGICAL HISTORY  patient denies any surgical history    SOCIAL HISTORY  Social History     Social History Main Topics   • Smoking status: Never Smoker   • Smokeless tobacco: Never Used   • Alcohol use Yes      Comment: Occasionally   • Drug use: No   • Sexual activity: Not on file     REVIEW OF SYSTEMS  See HPI for further details.  All other systems are negative except as above in HPI.      PHYSICAL EXAM  VITAL SIGNS: /86   Pulse 67   Temp 36.8 °C (98.3 °F) (Temporal)   Resp 16   Ht 1.803 m (5' 11\")   Wt 82.1 kg (181 lb)   SpO2 99%   BMI 25.24 kg/m²     General:  WDWN, nontoxic appearing in NAD; A+Ox3; V/S as above   Skin: warm and dry; good color; no " rash  HEENT: NCAT; EOMs intact; PERRL; no scleral icterus   Neck: FROM; no meningismus, no LAD  Cardiovascular: Regular heart rate and rhythm.  No murmurs, rubs, or gallops; pulses 2+ bilaterally radially   Lungs: Clear to auscultation with good air movement bilaterally.  No wheezes, rhonchi, or rales.   Extremities: WILLIS x 4; no e/o trauma; no pedal edema; neg Destiny's  Neurologic: CNs III-XII grossly intact; speech clear; distal sensation intact; strength 5/5 UE/LEs  Psychiatric: Appropriate affect, normal mood    LABS  Results for orders placed or performed during the hospital encounter of 06/19/19   CBC WITH DIFFERENTIAL   Result Value Ref Range    WBC 6.2 4.8 - 10.8 K/uL    RBC 5.83 4.70 - 6.10 M/uL    Hemoglobin 16.2 14.0 - 18.0 g/dL    Hematocrit 49.8 42.0 - 52.0 %    MCV 85.4 81.4 - 97.8 fL    MCH 27.8 27.0 - 33.0 pg    MCHC 32.5 (L) 33.7 - 35.3 g/dL    RDW 40.8 35.9 - 50.0 fL    Platelet Count 315 164 - 446 K/uL    MPV 9.6 9.0 - 12.9 fL    Neutrophils-Polys 65.20 44.00 - 72.00 %    Lymphocytes 23.00 22.00 - 41.00 %    Monocytes 7.00 0.00 - 13.40 %    Eosinophils 3.70 0.00 - 6.90 %    Basophils 0.80 0.00 - 1.80 %    Immature Granulocytes 0.30 0.00 - 0.90 %    Nucleated RBC 0.00 /100 WBC    Neutrophils (Absolute) 4.03 1.82 - 7.42 K/uL    Lymphs (Absolute) 1.42 1.00 - 4.80 K/uL    Monos (Absolute) 0.43 0.00 - 0.85 K/uL    Eos (Absolute) 0.23 0.00 - 0.51 K/uL    Baso (Absolute) 0.05 0.00 - 0.12 K/uL    Immature Granulocytes (abs) 0.02 0.00 - 0.11 K/uL    NRBC (Absolute) 0.00 K/uL   COMP METABOLIC PANEL   Result Value Ref Range    Sodium 139 135 - 145 mmol/L    Potassium 3.8 3.6 - 5.5 mmol/L    Chloride 107 96 - 112 mmol/L    Co2 24 20 - 33 mmol/L    Anion Gap 8.0 0.0 - 11.9    Glucose 97 65 - 99 mg/dL    Bun 11 8 - 22 mg/dL    Creatinine 0.92 0.50 - 1.40 mg/dL    Calcium 9.4 8.5 - 10.5 mg/dL    AST(SGOT) 20 12 - 45 U/L    ALT(SGPT) 17 2 - 50 U/L    Alkaline Phosphatase 91 30 - 99 U/L    Total Bilirubin 0.5 0.1 -  1.5 mg/dL    Albumin 4.5 3.2 - 4.9 g/dL    Total Protein 7.3 6.0 - 8.2 g/dL    Globulin 2.8 1.9 - 3.5 g/dL    A-G Ratio 1.6 g/dL   ESTIMATED GFR   Result Value Ref Range    GFR If African American >60 >60 mL/min/1.73 m 2    GFR If Non African American >60 >60 mL/min/1.73 m 2         IMAGING  CT-CTA HEAD WITH & W/O-POST PROCESS   Final Result      CT angiogram of the [place] within normal limits.      CT-CTA NECK WITH & W/O-POST PROCESSING   Final Result      CT angiogram of the neck within normal limits.            MEDICAL RECORD  I have reviewed patient's medical record and pertinent results are listed below.      COURSE & MEDICAL DECISION MAKING  I have reviewed any medical record information, laboratory studies and radiographic results as noted.    Eris Matt is a 24 y.o. male who presents complaining of headache, nausea, vomiting following head trauma at work 2 days ago.      NS bolus was ordered for possible dehydration related to patient's sxs of vomiting.        Pt was re-evaluated at 2:32 PM  Patient feels improved after fluids and anti-emetic.  CT/CTA negative  Patient will be discharged with follow-up with [employer]/Workmen's Compensation and was given return precautions.    Pt's blood pressure was noted to be above 120/80 here in the ER.  Pt was informed and advised to follow up as an outpatient for recheck for possible dx/management of hypertension.    FINAL IMPRESSION  1. Acute post-traumatic headache, not intractable        Electronically signed by: Puja Barrera, 6/19/2019 1:11 PM

## 2019-06-19 NOTE — ED NOTES
Pt ambulatory with steady gait, pt verbalized understanding of poc and discharge , no questions ,  VSS and pt in nad at this time  Pt verbalized understanding of followup with occ at his work

## 2019-06-19 NOTE — DISCHARGE INSTRUCTIONS
Take Tylenol and/or Motrin as needed for pain  Take Zofran as needed for nausea/vomiting  Follow-up with [employer]/Workmen's Compensation for recheck tomorrow

## 2019-06-19 NOTE — ED NOTES
"Agree with triage assessment, no changes noted. Pt reports \"Headache has been getting worse since Monday and I haven't been able to keep anything down.\" Pt reports pain of 4 out of 10. Pt denies any further needs at this time. Call light within reach. Bed in low locked position. Comfort measures provided.     "

## 2019-06-19 NOTE — LETTER
[facility], EMERGENCY DEPT   [address], [place] [address]  Phone: Dept: [phone] - Fax:        [facility] Progress Report and Disability Certification  Date of Service: 6/19/2019   No Show:  No  Date / Time of Next Visit:     Claim Information   Patient Name: Eris Matt  Claim Number:     Employer:   [employer] Date of Injury: 6/17/2019     Insurer / TPA: Jignesh Triplett ID / SSN:    Occupation:  Diagnosis: The encounter diagnosis was Acute post-traumatic headache, not intractable.    Medical Information   Related to Industrial Injury? Yes ***   Subjective Complaints:  Headache, nausea, vomiting following head injury 2 days ago   Objective Findings: No hematoma or evidence of trauma on the back of the head   Pre-Existing Condition(s):     Assessment:   Condition Worsened    Status: Additional Care Required  Permanent Disability:  Comments:Undetermined    Plan:   Comments:Follow-up with Workmen's Compensation/[employer]    Diagnostics: CT  Comments:CT/CTA negative    Comments:       Disability Information   Status: Temporarily Totally Disabled    From:  6/19/2019  Through:   Restrictions are:   Comments:Unclear at this time   Physical Restrictions   Sitting:    Standing:    Stooping:    Bending:      Squatting:    Walking:    Climbing:    Pushing:      Pulling:    Other:    Reaching Above Shoulder (L):   Reaching Above Shoulder (R):       Reaching Below Shoulder (L):    Reaching Below Shoulder (R):      Not to exceed Weight Limits   Carrying(hrs):   Weight Limit(lb):   Lifting(hrs):   Weight  Limit(lb):     Comments:      Repetitive Actions   Hands: i.e. Fine Manipulations from Grasping:     Feet: i.e. Operating Foot Controls:     Driving / Operate Machinery:     Physician Name: Puja Barrera Physician Signature: PUJA Sidhu M.D. e-Signature:  , Medical Director   Clinic Name / Location: [facility]  [facility], EMERGENCY DEPT  [address]  [place] [address]  [phone]     Clinic Phone Number: Dept: [phone]   Appointment Time:  Visit Start Time:    Check-In Time:  12:29 PM Visit Discharge Time:    Original-Treating Physician or Chiropractor    Page 2-Insurer/TPA    Page 3-Employer    Page 4-Employee

## 2019-06-19 NOTE — LETTER
[facility], EMERGENCY DEPT   [address] [place], [place] [address]  Phone: Dept: [phone] - Fax:        [facility] Progress Report and Disability Certification  Date of Service: 6/19/2019   No Show:  No  Date / Time of Next Visit:     Claim Information   Patient Name: Eris Matt  Claim Number:     Employer:   [employer] Date of Injury: 6/17/2019     Insurer / TPA: Jignesh Triplett ID / SSN:[id number]   Occupation:  Diagnosis: The encounter diagnosis was Acute post-traumatic headache, not intractable.    Medical Information   Related to Industrial Injury? Yes    Subjective Complaints:  Headache, nausea, vomiting following head injury 2 days ago   Objective Findings: No hematoma or evidence of trauma on the back of the head   Pre-Existing Condition(s):     Assessment:   Condition Worsened    Status: Additional Care Required  Permanent Disability:  Comments:Undetermined    Plan:   Comments:Follow-up with Workmen's Compensation/[employer]    Diagnostics: CT  Comments:CT/CTA negative    Comments:       Disability Information   Status: Temporarily Totally Disabled    From:  6/19/2019  Through:   Restrictions are:   Comments:Unclear at this time   Physical Restrictions   Sitting:    Standing:    Stooping:    Bending:      Squatting:    Walking:    Climbing:    Pushing:      Pulling:    Other:    Reaching Above Shoulder (L):   Reaching Above Shoulder (R):       Reaching Below Shoulder (L):    Reaching Below Shoulder (R):      Not to exceed Weight Limits   Carrying(hrs):   Weight Limit(lb):   Lifting(hrs):   Weight  Limit(lb):     Comments:      Repetitive Actions   Hands: i.e. Fine Manipulations from Grasping:     Feet: i.e. Operating Foot Controls:     Driving / Operate Machinery:     Physician Name: Puja Barrera Physician Signature: PUJA Sidhu M.D. e-Signature:  , Medical Director   Clinic Name / Location: [facility]  [facility], EMERGENCY DEPT  [address]  [place] [address]  [phone]     Clinic Phone Number: Dept: [phone]   Appointment Time:  Visit Start Time:    Check-In Time:  12:29 PM Visit Discharge Time:    Original-Treating Physician or Chiropractor    Page 2-Insurer/TPA    Page 3-Employer    Page 4-Employee

## 2019-06-19 NOTE — ED TRIAGE NOTES
"Chief Complaint   Patient presents with   • Headache     Pt reports symptoms started today. Seen on Monday s/p tire falling on his head.   • Nausea     /86   Pulse 67   Temp 36.8 °C (98.3 °F) (Temporal)   Resp 16   Ht 1.803 m (5' 11\")   Wt 82.1 kg (181 lb)   SpO2 99%   BMI 25.24 kg/m²     Pt ambulated into triage, denies vision changes, gait steady. AOx4. VS as above, NAD, encouraged to return to the triage nurse or tech with any new complaints or symptoms.  "

## 2019-06-19 NOTE — LETTER
[facility], EMERGENCY DEPT   [address] [place], [place] [address]  Phone: Dept: [phone] - Fax:        [facility] Progress Report and Disability Certification  Date of Service: 6/19/2019   No Show:  No  Date / Time of Next Visit:     Claim Information   Patient Name: Eris Matt  Claim Number:     Employer:   [employer] Date of Injury: 6/17/2019     Insurer / TPA: Jignesh Triplett ID / SSN:    Occupation:  Diagnosis: The encounter diagnosis was Acute post-traumatic headache, not intractable.    Medical Information   Related to Industrial Injury? Yes ***   Subjective Complaints:  Headache, nausea, vomiting following head injury 2 days ago   Objective Findings: No hematoma or evidence of trauma on the back of the head   Pre-Existing Condition(s):     Assessment:   Condition Worsened    Status: Additional Care Required  Permanent Disability:  Comments:Undetermined    Plan:   Comments:Follow-up with Workmen's Compensation/[employer]    Diagnostics: CT  Comments:CT/CTA negative    Comments:       Disability Information   Status: Temporarily Totally Disabled    From:  6/19/2019  Through:   Restrictions are:   Comments:Unclear at this time   Physical Restrictions   Sitting:    Standing:    Stooping:    Bending:      Squatting:    Walking:    Climbing:    Pushing:      Pulling:    Other:    Reaching Above Shoulder (L):   Reaching Above Shoulder (R):       Reaching Below Shoulder (L):    Reaching Below Shoulder (R):      Not to exceed Weight Limits   Carrying(hrs):   Weight Limit(lb):   Lifting(hrs):   Weight  Limit(lb):     Comments:      Repetitive Actions   Hands: i.e. Fine Manipulations from Grasping:     Feet: i.e. Operating Foot Controls:     Driving / Operate Machinery:     Physician Name: Puja Barrera Physician Signature: PUJA Sidhu M.D. e-Signature:  , Medical Director   Clinic Name / Location: [facility]  [facility], EMERGENCY DEPT  [address]  [place] [address]  [phone]     Clinic Phone Number: Dept: [phone]   Appointment Time:  Visit Start Time:    Check-In Time:  12:29 PM Visit Discharge Time:    Original-Treating Physician or Chiropractor    Page 2-Insurer/TPA    Page 3-Employer    Page 4-Employee

## 2019-11-26 ENCOUNTER — OFFICE VISIT (OUTPATIENT)
Dept: URGENT CARE | Facility: CLINIC | Age: 24
End: 2019-11-26
Payer: COMMERCIAL

## 2019-11-26 VITALS
HEART RATE: 80 BPM | HEIGHT: 71 IN | OXYGEN SATURATION: 97 % | BODY MASS INDEX: 23.8 KG/M2 | SYSTOLIC BLOOD PRESSURE: 124 MMHG | DIASTOLIC BLOOD PRESSURE: 86 MMHG | TEMPERATURE: 99.2 F | RESPIRATION RATE: 14 BRPM | WEIGHT: 170 LBS

## 2019-11-26 DIAGNOSIS — S39.012A STRAIN OF LUMBAR REGION, INITIAL ENCOUNTER: ICD-10-CM

## 2019-11-26 PROCEDURE — 99203 OFFICE O/P NEW LOW 30 MIN: CPT | Performed by: FAMILY MEDICINE

## 2019-11-26 RX ORDER — CYCLOBENZAPRINE HCL 10 MG
10 TABLET ORAL EVERY EVENING
Qty: 20 TAB | Refills: 0 | Status: SHIPPED | OUTPATIENT
Start: 2019-11-26

## 2019-11-26 RX ORDER — IBUPROFEN 200 MG
200 TABLET ORAL EVERY 6 HOURS PRN
COMMUNITY

## 2019-11-26 ASSESSMENT — ENCOUNTER SYMPTOMS
FEVER: 0
SHORTNESS OF BREATH: 0
TINGLING: 0
WEAKNESS: 0
SENSORY CHANGE: 0
BACK PAIN: 1
VOMITING: 0

## 2019-11-26 NOTE — LETTER
November 26, 2019    To Whom It May Concern:         This is confirmation that Eris Matt attended his scheduled appointment with Evans Whitt M.D. on 11/26/19.  Please excuse him from work today.           If you have any questions please do not hesitate to call me at the phone number listed below.    Sincerely,          Evans Whitt M.D.  [phone]

## 2019-11-26 NOTE — PROGRESS NOTES
"Subjective:     Eris Matt is a 24 y.o. male who presents for Back Pain (x1 week lower back no injury )    HPI  Pt presents for evaluation of a new problem   Pt with low back pain for the past 1 week   Pt does not recall a fall or injury   Pain is in the lumbar area, stays focal, and does not radiate to legs  No weakness, numbness, tingling, changes in bowel or bladder function  Pt works a physical job   Has been working a few more hours lately     Review of Systems   Constitutional: Negative for fever.   Respiratory: Negative for shortness of breath.    Cardiovascular: Negative for chest pain.   Gastrointestinal: Negative for vomiting.   Musculoskeletal: Positive for back pain.   Skin: Negative for rash.   Neurological: Negative for tingling, sensory change and weakness.     PMH: No chronic medical problems   MEDS:   Current Outpatient Medications:   •  ibuprofen (MOTRIN) 200 MG Tab, Take 200 mg by mouth every 6 hours as needed., Disp: , Rfl:   •  ondansetron (ZOFRAN ODT) 4 MG TABLET DISPERSIBLE, Take 1 Tab by mouth every 8 hours as needed for Nausea. (Patient not taking: Reported on 11/26/2019), Disp: 10 Tab, Rfl: 0  ALLERGIES: No Known Allergies  SURGHX: History reviewed. No pertinent surgical history.  SOCHX:  reports that he has never smoked. He has never used smokeless tobacco. He reports current alcohol use. He reports that he does not use drugs.  FH: Family history was reviewed, not contributing to acute back pain      Objective:   /86   Pulse 80   Temp 37.3 °C (99.2 °F)   Resp 14   Ht 1.803 m (5' 11\")   Wt 77.1 kg (170 lb)   SpO2 97%   BMI 23.71 kg/m²     Physical Exam  Constitutional:       General: He is not in acute distress.     Appearance: He is well-developed. He is not diaphoretic.   HENT:      Head: Normocephalic and atraumatic.   Musculoskeletal:      Comments: Back:  General: No asymmetry, bruising, or erythema appreciated  ROM: flexion 90°, extension 30°, lateral bend 30°, " lateral twist 30°  Palpation: No tenderness to palpation of spinous processes, no step-offs appreciated, +tenderness and tightness to palpation of paraspinal muscles in lumbar area, no significant scoliosis appreciated  Strength: 5/5 hip flexion/extension  Special tests: Straight leg raise -   Neuro: Sensation intact and equal bilaterally in LE's   Skin:     General: Skin is warm and dry.      Findings: No rash.   Neurological:      Mental Status: He is alert and oriented to person, place, and time.   Psychiatric:         Behavior: Behavior normal.         Thought Content: Thought content normal.         Judgment: Judgment normal.       Assessment/Plan:   Assessment    1. Strain of lumbar region, initial encounter  - cyclobenzaprine (FLEXERIL) 10 MG Tab; Take 1 Tab by mouth every evening.  Dispense: 20 Tab; Refill: 0    Patient is a 24-year-old male with lumbar strain.  Has no fall, injury, or indication for imaging at this point.  Will treat conservatively with activity modification, muscle relaxer at night, heat, gentle stretching, and was given a handout with exercises to start doing.  Advised to follow-up if not making great improvements over the next 2 weeks.